# Patient Record
Sex: MALE | Race: BLACK OR AFRICAN AMERICAN | NOT HISPANIC OR LATINO | Employment: UNEMPLOYED | URBAN - METROPOLITAN AREA
[De-identification: names, ages, dates, MRNs, and addresses within clinical notes are randomized per-mention and may not be internally consistent; named-entity substitution may affect disease eponyms.]

---

## 2023-10-25 ENCOUNTER — HOSPITAL ENCOUNTER (EMERGENCY)
Facility: HOSPITAL | Age: 28
Discharge: HOME | End: 2023-10-25
Attending: EMERGENCY MEDICINE
Payer: COMMERCIAL

## 2023-10-25 VITALS
OXYGEN SATURATION: 100 % | HEIGHT: 65 IN | DIASTOLIC BLOOD PRESSURE: 91 MMHG | HEART RATE: 69 BPM | TEMPERATURE: 98.2 F | SYSTOLIC BLOOD PRESSURE: 151 MMHG | BODY MASS INDEX: 29.99 KG/M2 | RESPIRATION RATE: 20 BRPM | WEIGHT: 180 LBS

## 2023-10-25 DIAGNOSIS — R20.2 TINGLING IN EXTREMITIES: ICD-10-CM

## 2023-10-25 DIAGNOSIS — R21 RASH: Primary | ICD-10-CM

## 2023-10-25 DIAGNOSIS — J02.9 SORE THROAT: ICD-10-CM

## 2023-10-25 LAB
FLUAV RNA RESP QL NAA+PROBE: NEGATIVE
FLUBV RNA RESP QL NAA+PROBE: NEGATIVE
RSV RNA RESP QL NAA+PROBE: NEGATIVE
S PYO DNA THROAT QL NAA+PROBE: NOT DETECTED
SARS-COV-2 RNA RESP QL NAA+PROBE: NEGATIVE
TREPONEMA PALLIDUM IGG+IGM AB [PRESENCE] IN SERUM OR PLASMA BY IMMUNOASSAY: NORMAL

## 2023-10-25 PROCEDURE — 87651 STREP A DNA AMP PROBE: CPT | Performed by: EMERGENCY MEDICINE

## 2023-10-25 PROCEDURE — 99284 EMERGENCY DEPT VISIT MOD MDM: CPT | Performed by: EMERGENCY MEDICINE

## 2023-10-25 PROCEDURE — 36415 COLL VENOUS BLD VENIPUNCTURE: CPT | Performed by: EMERGENCY MEDICINE

## 2023-10-25 PROCEDURE — 99282 EMERGENCY DEPT VISIT SF MDM: CPT

## 2023-10-25 PROCEDURE — 0241U HB NFCT DS VIR RESP RNA 4 TRGT: CPT | Performed by: EMERGENCY MEDICINE

## 2023-10-25 PROCEDURE — 86780 TREPONEMA PALLIDUM: CPT | Performed by: EMERGENCY MEDICINE

## 2023-10-25 NOTE — ED PROVIDER NOTES
History  Chief Complaint   Patient presents with    Rash     Pt reports sore throat 3 days ago, with possible fever over the weekend. Pt presents with rash on hands, and feet, and reports tingling on both and tender to touch. HPI  Patient is a 42-year-old male presenting for evaluation of rash and tingling in hands and feet. Patient states that about 3 days ago he had a sore throat, subjective fevers and chills. Patient states that that is completely resolved. Patient states that starting overnight he developed rash on the palms and soles of his feet with minor pain as well as some itching and sensation of tingling in the hands and feet. Patient denies weakness or numbness. Patient denies neck pain, back pain, fatigue, constitutional symptoms. Patient states that his child had similar URI symptoms a few days ago which resolved. Patient states that he is sexually active with multiple partners, states that he typically uses barrier contraception, denies dysuria, penile discharge,  symptoms. None       History reviewed. No pertinent past medical history. History reviewed. No pertinent surgical history. History reviewed. No pertinent family history. I have reviewed and agree with the history as documented. E-Cigarette/Vaping    E-Cigarette Use Never User      E-Cigarette/Vaping Substances     Social History     Tobacco Use    Smoking status: Never    Smokeless tobacco: Never   Vaping Use    Vaping Use: Never used   Substance Use Topics    Alcohol use: Yes     Comment: socially    Drug use: Yes     Types: Marijuana       Review of Systems   Constitutional:  Negative for chills, fatigue and fever. HENT:  Positive for sore throat. Respiratory:  Negative for cough and shortness of breath. Gastrointestinal:  Negative for constipation, diarrhea and vomiting. Skin:  Positive for rash. Negative for color change, pallor and wound. Neurological:  Negative for headaches. Psychiatric/Behavioral:  Negative for confusion. All other systems reviewed and are negative. Physical Exam  Physical Exam  Vitals and nursing note reviewed. Constitutional:       General: He is not in acute distress. Appearance: Normal appearance. He is not ill-appearing, toxic-appearing or diaphoretic. HENT:      Head: Normocephalic and atraumatic. Comments: Moist mucous membranes. Normal-appearing tonsils. Right Ear: External ear normal.      Left Ear: External ear normal.   Eyes:      General:         Right eye: No discharge. Left eye: No discharge. Cardiovascular:      Comments: Regular rate and rhythm, no murmurs rubs or gallops. Extremities warm and well-perfused without mottling. Pulmonary:      Effort: No respiratory distress. Comments: No increased work of breathing. Speaking in complete sentences. Lungs clear to auscultation bilaterally without wheezes, rales, rhonchi. Satting 100% on room air indicating adequate oxygenation. Abdominal:      General: There is no distension. Comments: Abdomen soft, nontender, nondistended without rigidity, rebound, guarding   Musculoskeletal:         General: No deformity. Cervical back: Normal range of motion. Skin:     Findings: No lesion or rash. Comments: Several small patchy areas of macular rash on the hands and feet. No vesicular component. No desquamation. Does not involve any other part of the body. Neurological:      Mental Status: He is alert and oriented to person, place, and time. Mental status is at baseline.       Comments: AAOx4, pleasant, interactive   Psychiatric:         Mood and Affect: Mood and affect normal.         Vital Signs  ED Triage Vitals   Temperature Pulse Respirations Blood Pressure SpO2   10/25/23 0903 10/25/23 0903 10/25/23 0903 10/25/23 0903 10/25/23 0903   98.2 °F (36.8 °C) 69 20 151/91 100 %      Temp Source Heart Rate Source Patient Position - Orthostatic VS BP Location FiO2 (%)   10/25/23 0903 -- 10/25/23 0903 10/25/23 0903 --   Oral  Lying Right arm       Pain Score       10/25/23 0859       5           Vitals:    10/25/23 0903   BP: 151/91   Pulse: 69   Patient Position - Orthostatic VS: Lying         Visual Acuity      ED Medications  Medications - No data to display    Diagnostic Studies  Results Reviewed       Procedure Component Value Units Date/Time    COVID/FLU/RSV [[id number]] Collected: 10/25/23 0940    Lab Status: In process Specimen: Nares from Nose Updated: 10/25/23 0943    Strep A PCR [[id number]] Collected: 10/25/23 0940    Lab Status: In process Specimen: Throat Updated: 10/25/23 0943    RPR-Syphilis Screening (Total Syphilis IGG/IGM) [[id number]]     Lab Status: No result Specimen: Blood                    No orders to display              Procedures  Procedures         ED Course                               SBIRT 20yo+      Flowsheet Row Most Recent Value   Initial Alcohol Screen: US AUDIT-C     1. How often do you have a drink containing alcohol? 0 Filed at: 10/25/2023 0902   3a. Male UNDER 65: How often do you have five or more drinks on one occasion? 0 Filed at: 10/25/2023 0902   Audit-C Score 0 Filed at: 10/25/2023 5162   KRISTEL: How many times in the past year have you. .. Used an illegal drug or used a prescription medication for non-medical reasons? Never Filed at: 10/25/2023 2589                      Medical Decision Making  I obtained history from the patient. Given patient's reported sexual history, rash on hands and feet, I checked a syphilis RPR. Additionally screen for common viral causes including COVID, flu, RSV as well as a swab for strep. Provided information to establish care with a new primary care provider as requested. Provided with reassurance, discharged with return precautions. Amount and/or Complexity of Data Reviewed  Labs: ordered.              Disposition  Final diagnoses:   Rash   Sore throat   Tingling in extremities Time reflects when diagnosis was documented in both MDM as applicable and the Disposition within this note       Time User Action Codes Description Comment    10/25/2023  9:44 AM Kalpana Carpenter Add [R21] Rash     10/25/2023  9:44 AM Kalpana Carpenter Add [J02.9] Sore throat     10/25/2023  9:44 AM Kalpana Carpenter Add [R20.2] Tingling in extremities           ED Disposition       ED Disposition   Discharge    Condition   Stable    Date/Time   Wed Oct 25, 2023 [facility] discharge to home/self care. Follow-up Information       Follow up With Specialties Details Why Contact Info Additional Information    [address] Emergency Department Emergency Medicine  If symptoms worsen [address] [address]  [address] [facility], [address], [address] [place] [address]    [phone]               Patient's Medications    No medications on file       No discharge procedures on file.     PDMP Review       None            ED Provider  Electronically Signed by             Kalpana Carpenter MD  10/25/23 9609